# Patient Record
Sex: MALE | Race: WHITE
[De-identification: names, ages, dates, MRNs, and addresses within clinical notes are randomized per-mention and may not be internally consistent; named-entity substitution may affect disease eponyms.]

---

## 2018-12-22 ENCOUNTER — HOSPITAL ENCOUNTER (EMERGENCY)
Dept: HOSPITAL 47 - EC | Age: 83
Discharge: HOME | End: 2018-12-22
Payer: MEDICARE

## 2018-12-22 VITALS — DIASTOLIC BLOOD PRESSURE: 76 MMHG | SYSTOLIC BLOOD PRESSURE: 154 MMHG | HEART RATE: 77 BPM | RESPIRATION RATE: 18 BRPM

## 2018-12-22 VITALS — TEMPERATURE: 98 F

## 2018-12-22 DIAGNOSIS — J45.909: Primary | ICD-10-CM

## 2018-12-22 DIAGNOSIS — J84.10: ICD-10-CM

## 2018-12-22 DIAGNOSIS — Z87.891: ICD-10-CM

## 2018-12-22 DIAGNOSIS — R79.89: ICD-10-CM

## 2018-12-22 LAB
ANION GAP SERPL CALC-SCNC: 8 MMOL/L
BASOPHILS # BLD AUTO: 0 K/UL (ref 0–0.2)
BASOPHILS NFR BLD AUTO: 0 %
BUN SERPL-SCNC: 22 MG/DL (ref 9–20)
CALCIUM SPEC-MCNC: 9.1 MG/DL (ref 8.4–10.2)
CHLORIDE SERPL-SCNC: 105 MMOL/L (ref 98–107)
CO2 SERPL-SCNC: 25 MMOL/L (ref 22–30)
EOSINOPHIL # BLD AUTO: 0.1 K/UL (ref 0–0.7)
EOSINOPHIL NFR BLD AUTO: 2 %
ERYTHROCYTE [DISTWIDTH] IN BLOOD BY AUTOMATED COUNT: 3.95 M/UL (ref 4.3–5.9)
ERYTHROCYTE [DISTWIDTH] IN BLOOD: 12 % (ref 11.5–15.5)
GLUCOSE SERPL-MCNC: 116 MG/DL (ref 74–99)
HCT VFR BLD AUTO: 37.4 % (ref 39–53)
HGB BLD-MCNC: 12.7 GM/DL (ref 13–17.5)
LYMPHOCYTES # SPEC AUTO: 0.7 K/UL (ref 1–4.8)
LYMPHOCYTES NFR SPEC AUTO: 14 %
MCH RBC QN AUTO: 32.2 PG (ref 25–35)
MCHC RBC AUTO-ENTMCNC: 34 G/DL (ref 31–37)
MCV RBC AUTO: 94.6 FL (ref 80–100)
MONOCYTES # BLD AUTO: 0.4 K/UL (ref 0–1)
MONOCYTES NFR BLD AUTO: 7 %
NEUTROPHILS # BLD AUTO: 4 K/UL (ref 1.3–7.7)
NEUTROPHILS NFR BLD AUTO: 75 %
PLATELET # BLD AUTO: 210 K/UL (ref 150–450)
POTASSIUM SERPL-SCNC: 4.6 MMOL/L (ref 3.5–5.1)
SODIUM SERPL-SCNC: 138 MMOL/L (ref 137–145)
WBC # BLD AUTO: 5.3 K/UL (ref 3.8–10.6)

## 2018-12-22 PROCEDURE — 99285 EMERGENCY DEPT VISIT HI MDM: CPT

## 2018-12-22 PROCEDURE — 93005 ELECTROCARDIOGRAM TRACING: CPT

## 2018-12-22 PROCEDURE — 84484 ASSAY OF TROPONIN QUANT: CPT

## 2018-12-22 PROCEDURE — 80048 BASIC METABOLIC PNL TOTAL CA: CPT

## 2018-12-22 PROCEDURE — 85025 COMPLETE CBC W/AUTO DIFF WBC: CPT

## 2018-12-22 PROCEDURE — 83880 ASSAY OF NATRIURETIC PEPTIDE: CPT

## 2018-12-22 PROCEDURE — 87502 INFLUENZA DNA AMP PROBE: CPT

## 2018-12-22 PROCEDURE — 36415 COLL VENOUS BLD VENIPUNCTURE: CPT

## 2018-12-22 PROCEDURE — 71046 X-RAY EXAM CHEST 2 VIEWS: CPT

## 2018-12-22 PROCEDURE — 94640 AIRWAY INHALATION TREATMENT: CPT

## 2018-12-22 NOTE — XR
EXAMINATION TYPE: XR chest 2V

 

DATE OF EXAM: 12/22/2018

 

COMPARISON: NONE

 

HISTORY: Cough and congestion

 

TECHNIQUE:  Frontal and lateral views of the chest are obtained.

 

FINDINGS:  There is no heart failure nor confluent pneumonic infiltrate. Costophrenic angles are nii
r. There are chest leads. There is right shoulder prosthesis. There is some coarsening of the interst
itial markings in the mid and upper lobes.

 

IMPRESSION:  No active cardiopulmonary disease. Mild pulmonary fibrosis. Normal heart.

## 2018-12-22 NOTE — ED
General Adult HPI





- General


Chief complaint: Shortness of Breath


Stated complaint: SOB


Time Seen by Provider: 12/22/18 16:06


Source: patient


Mode of arrival: ambulatory


Limitations: no limitations





- History of Present Illness


Initial comments: 


Patient is an 84-year-old male who presents with a chief complaint of cough for 

over a week.  The patient states that he has been coughing up yellow phlegm.  

He states that he gets short of breath when he tries to exert himself.  It is a 

history of asthma, he quit smoking 50 years ago.  Patient does not take any 

medications at home.  He does not have any history of cardiac pathology, strokes

, DVTs or PEs.  He does not have any known ALLERGIES.  The patient states that 

he has had sick contacts.  He cannot identify any aggravating or alleviating 

factors to his cough.








- Related Data


 Home Medications











 Medication  Instructions  Recorded  Confirmed


 


Herbal Multivitamin 1 tab PO DAILY 12/22/18 12/22/18








 Previous Rx's











 Medication  Instructions  Recorded


 


Albuterol Inhaler [Ventolin Hfa 1 - 2 puff INHALATION Q4H #1 12/22/18





Inhaler] inhaler 


 


Azithromycin [Zithromax] 250 mg PO AS DIRECTED #6 tab 12/22/18


 


predniSONE [Deltasone] 20 mg PO DAILY #12 tablet 12/22/18











 Allergies











Allergy/AdvReac Type Severity Reaction Status Date / Time


 


No Known Allergies Allergy   Verified 12/22/18 16:40














Review of Systems


ROS Statement: 


Those systems with pertinent positive or pertinent negative responses have been 

documented in the HPI.





ROS Other: All systems not noted in ROS Statement are negative.


Respiratory: Reports: cough, dyspnea





Past Medical History


Past Medical History: Asthma


History of Any Multi-Drug Resistant Organisms: None Reported


Past Surgical History: Hernia Repair


Additional Past Surgical History / Comment(s): Shoulder, Brandon Knee, Back


Past Psychological History: No Psychological Hx Reported


Smoking Status: Never smoker


Past Alcohol Use History: Occasional


Past Drug Use History: None Reported





General Exam


Limitations: no limitations


General appearance: alert, in no apparent distress


Head exam: Present: atraumatic, normocephalic


Eye exam: Present: normal appearance


ENT exam: Present: normal exam, mucous membranes moist


Neck exam: Present: normal inspection.  Absent: lymphadenopathy


Respiratory exam: Present: wheezes (Expiratory), decreased breath sounds


Cardiovascular Exam: Present: regular rate, normal rhythm


GI/Abdominal exam: Present: soft, other (Diastases recti present.).  Absent: 

distended, tenderness


Rectal exam: Present: deferred


Extremities exam: Present: normal inspection


Back exam: Present: normal inspection


Neurological exam: Present: alert, oriented X3, CN II-XII intact, normal gait


Psychiatric exam: Present: normal affect, normal mood


Skin exam: Present: warm, dry, intact





Course


 Vital Signs











  12/22/18 12/22/18 12/22/18





  15:29 16:30 16:52


 


Temperature 98 F  


 


Pulse Rate 100 100 92


 


Respiratory 24  





Rate   


 


Blood Pressure 165/89  


 


O2 Sat by Pulse 98  





Oximetry   














  12/22/18





  17:00


 


Temperature 


 


Pulse Rate 94


 


Respiratory 





Rate 


 


Blood Pressure 157/89


 


O2 Sat by Pulse 96





Oximetry 














Medical Decision Making





- Medical Decision Making


Patient presents with a chief complaint of a cough.  On initial evaluation, 

vital signs are stable, patient is in no acute distress.  Patient will be 

evaluated with basic labs including cardiac enzymes, and chest x-ray.  He was 

given aspirin, DuoNeb, prednisone.  Suspect bronchitis.





6:23 PM


Lab evaluation this patient is unremarkable except for mild increase in 

creatinine which is of unknown chronicity.  Electrolytes are within normal 

limits.  Chest x-ray shows no acute process, of note there is a mild degree of 

pulmonary fibrosis.  At reevaluation, the patient states he feels improved 

after breathing treatments and steroids.  He'll be treated for bronchitis on 

the outpatient basis.  He was instructed to follow up with primary care in 1-2 

days, return to the ED if symptoms worsen or change.








- Lab Data


Result diagrams: 


 12/22/18 16:40





 12/22/18 16:40


 Lab Results











  12/22/18 12/22/18 12/22/18 Range/Units





  16:20 16:40 16:40 


 


WBC    5.3  (3.8-10.6)  k/uL


 


RBC    3.95 L  (4.30-5.90)  m/uL


 


Hgb    12.7 L  (13.0-17.5)  gm/dL


 


Hct    37.4 L  (39.0-53.0)  %


 


MCV    94.6  (80.0-100.0)  fL


 


MCH    32.2  (25.0-35.0)  pg


 


MCHC    34.0  (31.0-37.0)  g/dL


 


RDW    12.0  (11.5-15.5)  %


 


Plt Count    210  (150-450)  k/uL


 


Neutrophils %    75  %


 


Lymphocytes %    14  %


 


Monocytes %    7  %


 


Eosinophils %    2  %


 


Basophils %    0  %


 


Neutrophils #    4.0  (1.3-7.7)  k/uL


 


Lymphocytes #    0.7 L  (1.0-4.8)  k/uL


 


Monocytes #    0.4  (0-1.0)  k/uL


 


Eosinophils #    0.1  (0-0.7)  k/uL


 


Basophils #    0.0  (0-0.2)  k/uL


 


Sodium   138   (137-145)  mmol/L


 


Potassium   4.6   (3.5-5.1)  mmol/L


 


Chloride   105   ()  mmol/L


 


Carbon Dioxide   25   (22-30)  mmol/L


 


Anion Gap   8   mmol/L


 


BUN   22 H   (9-20)  mg/dL


 


Creatinine   1.49 H   (0.66-1.25)  mg/dL


 


Est GFR (CKD-EPI)AfAm   49   (>60 ml/min/1.73 sqM)  


 


Est GFR (CKD-EPI)NonAf   43   (>60 ml/min/1.73 sqM)  


 


Glucose   116 H   (74-99)  mg/dL


 


Calcium   9.1   (8.4-10.2)  mg/dL


 


Troponin I     (0.000-0.034)  ng/mL


 


NT-Pro-B Natriuret Pep     pg/mL


 


Influenza Type A RNA  Not Detected    (Not Detectd)  


 


Influenza Type B (PCR)  Not Detected    (Not Detectd)  














  12/22/18 12/22/18 Range/Units





  16:40 16:40 


 


WBC    (3.8-10.6)  k/uL


 


RBC    (4.30-5.90)  m/uL


 


Hgb    (13.0-17.5)  gm/dL


 


Hct    (39.0-53.0)  %


 


MCV    (80.0-100.0)  fL


 


MCH    (25.0-35.0)  pg


 


MCHC    (31.0-37.0)  g/dL


 


RDW    (11.5-15.5)  %


 


Plt Count    (150-450)  k/uL


 


Neutrophils %    %


 


Lymphocytes %    %


 


Monocytes %    %


 


Eosinophils %    %


 


Basophils %    %


 


Neutrophils #    (1.3-7.7)  k/uL


 


Lymphocytes #    (1.0-4.8)  k/uL


 


Monocytes #    (0-1.0)  k/uL


 


Eosinophils #    (0-0.7)  k/uL


 


Basophils #    (0-0.2)  k/uL


 


Sodium    (137-145)  mmol/L


 


Potassium    (3.5-5.1)  mmol/L


 


Chloride    ()  mmol/L


 


Carbon Dioxide    (22-30)  mmol/L


 


Anion Gap    mmol/L


 


BUN    (9-20)  mg/dL


 


Creatinine    (0.66-1.25)  mg/dL


 


Est GFR (CKD-EPI)AfAm    (>60 ml/min/1.73 sqM)  


 


Est GFR (CKD-EPI)NonAf    (>60 ml/min/1.73 sqM)  


 


Glucose    (74-99)  mg/dL


 


Calcium    (8.4-10.2)  mg/dL


 


Troponin I   <0.012  (0.000-0.034)  ng/mL


 


NT-Pro-B Natriuret Pep  168   pg/mL


 


Influenza Type A RNA    (Not Detectd)  


 


Influenza Type B (PCR)    (Not Detectd)  














Disposition


Clinical Impression: 


 Bronchitis





Disposition: HOME SELF-CARE


Condition: Good


Instructions:  Acute Bronchitis (ED)


Is patient prescribed a controlled substance at d/c from ED?: No


Referrals: 


Mary Soto MD [Primary Care Provider] - 1-2 days

## 2021-04-27 ENCOUNTER — HOSPITAL ENCOUNTER (OUTPATIENT)
Dept: HOSPITAL 47 - RADXRYALE | Age: 86
Discharge: HOME | End: 2021-04-27
Attending: INTERNAL MEDICINE
Payer: MEDICARE

## 2021-04-27 DIAGNOSIS — M43.16: ICD-10-CM

## 2021-04-27 DIAGNOSIS — M16.11: Primary | ICD-10-CM

## 2021-04-27 PROCEDURE — 72110 X-RAY EXAM L-2 SPINE 4/>VWS: CPT

## 2021-04-27 PROCEDURE — 73502 X-RAY EXAM HIP UNI 2-3 VIEWS: CPT

## 2021-04-27 NOTE — XR
EXAM TYPE: LUMBAR SPINE X RAY SERIES

 

COMPARISON: NONE

 

HISTORY: Pain

 

TECHNIQUE: 4 views are submitted.

 

FINDINGS:

Postsurgical change throughout the lumbar spine with multilevel severe degenerative disc disease. Min
imal anterolisthesis of L4 relative to L5. Vertebral body height is preserved. Diffuse osteopenia not
ed. Multilevel severe degenerative disc disease. Curvature of the spine noted. SI joints symmetric.

 

IMPRESSION:

1. Postoperative changes with minimal anterolisthesis L4 on L5.

## 2021-04-27 NOTE — XR
EXAMINATION TYPE: XR Hip Complete RT

 

DATE OF EXAM: 4/27/2021

 

COMPARISON: NONE

 

HISTORY: Pain

 

TECHNIQUE: 2 views submitted

 

FINDINGS:

There is no evidence of erosive change or acute fracture. Diffuse osteopenia. Severe narrowing concen
trically the joint space. Postsurgical changes lower lumbar spine.

 

IMPRESSION:

1. 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

## 2021-06-01 ENCOUNTER — HOSPITAL ENCOUNTER (OUTPATIENT)
Dept: HOSPITAL 47 - RADUSWWP | Age: 86
Discharge: HOME | End: 2021-06-01
Attending: INTERNAL MEDICINE
Payer: MEDICARE

## 2021-06-01 DIAGNOSIS — R22.42: Primary | ICD-10-CM

## 2021-06-01 DIAGNOSIS — M79.605: ICD-10-CM

## 2021-06-01 NOTE — US
EXAMINATION TYPE: US venous doppler duplex LE LT

 

DATE OF EXAM: 6/1/2021 2:09 PM

 

COMPARISON: None

 

CLINICAL HISTORY: Left leg pain and swelling R22.42.

 

SIDE PERFORMED: Left  

 

TECHNIQUE:  The lower extremity deep venous system is examined utilizing real time linear array sonog
nicole with graded compression, doppler sonography and color-flow sonography.

 

VESSELS IMAGED:

Common Femoral Vein

Deep Femoral Vein

Greater Saphenous Vein *

Femoral Vein

Popliteal Vein

Small Saphenous Vein *

Proximal Calf Veins

(* superficial vessels)

 

 

Left Leg:  No evidence of deep venous thrombosis in the left lower extremity veins.

 

IMPRESSION: No evidence of deep venous thrombosis in the left lower extremity veins.

## 2021-08-04 ENCOUNTER — HOSPITAL ENCOUNTER (OUTPATIENT)
Dept: HOSPITAL 47 - RADUSWWP | Age: 86
Discharge: HOME | End: 2021-08-04
Attending: INTERNAL MEDICINE
Payer: MEDICARE

## 2021-08-04 DIAGNOSIS — R60.0: Primary | ICD-10-CM

## 2021-08-04 DIAGNOSIS — M79.604: ICD-10-CM

## 2021-08-04 NOTE — US
EXAMINATION TYPE: US venous doppler duplex LE RT

 

DATE OF EXAM: 8/4/2021 9:22 AM

 

COMPARISON: Prev Left leg only

 

CLINICAL HISTORY: R60.0 Edema. Pt states right leg pain and tightness 

 

SIDE PERFORMED: Right  

 

TECHNIQUE:  The lower extremity deep venous system is examined utilizing real time linear array sonog
nicole with graded compression, doppler sonography and color-flow sonography.

 

VESSELS IMAGED:

Common Femoral Vein

Deep Femoral Vein

Greater Saphenous Vein *

Femoral Vein

Popliteal Vein

Small Saphenous Vein *

Proximal Calf Veins

(* superficial vessels)

 

 

 

Right Leg:  Negative for DVT

 

 

 

 

IMPRESSION:

1. Right lower extremity ultrasound negative for deep venous thrombosis.

## 2021-11-01 ENCOUNTER — HOSPITAL ENCOUNTER (OUTPATIENT)
Dept: HOSPITAL 47 - LABWHC1 | Age: 86
Discharge: HOME | End: 2021-11-01
Attending: NURSE PRACTITIONER
Payer: MEDICARE

## 2021-11-01 DIAGNOSIS — I50.9: Primary | ICD-10-CM

## 2021-11-01 LAB
ERYTHROCYTE [DISTWIDTH] IN BLOOD BY AUTOMATED COUNT: 3.57 X 10*6/UL (ref 4.4–5.6)
ERYTHROCYTE [DISTWIDTH] IN BLOOD: 12.7 % (ref 11.5–14.5)
HCT VFR BLD AUTO: 35.4 % (ref 39.6–50)
HGB BLD-MCNC: 11.2 G/DL (ref 13–17)
MCH RBC QN AUTO: 31.4 PG (ref 27–32)
MCHC RBC AUTO-ENTMCNC: 31.6 G/DL (ref 32–37)
MCV RBC AUTO: 99.2 FL (ref 80–97)
PLATELET # BLD AUTO: 243 X 10*3/UL (ref 140–440)
WBC # BLD AUTO: 7.29 X 10*3/UL (ref 4.5–10)

## 2021-11-01 PROCEDURE — 36415 COLL VENOUS BLD VENIPUNCTURE: CPT

## 2021-11-01 PROCEDURE — 85027 COMPLETE CBC AUTOMATED: CPT

## 2021-11-01 PROCEDURE — 83880 ASSAY OF NATRIURETIC PEPTIDE: CPT

## 2021-11-02 ENCOUNTER — HOSPITAL ENCOUNTER (OUTPATIENT)
Dept: HOSPITAL 47 - LABWHC1 | Age: 86
Discharge: HOME | End: 2021-11-02
Attending: PSYCHIATRY & NEUROLOGY
Payer: MEDICARE

## 2021-11-02 DIAGNOSIS — I50.9: Primary | ICD-10-CM

## 2021-11-02 LAB
GLUCOSE 2H P 75 G GLC PO SERPL-MCNC: 115 MG/DL
GTT SERPL-IMP: (no result)

## 2021-11-02 PROCEDURE — 80053 COMPREHEN METABOLIC PANEL: CPT

## 2021-11-02 PROCEDURE — 82950 GLUCOSE TEST: CPT

## 2021-11-02 PROCEDURE — 82947 ASSAY GLUCOSE BLOOD QUANT: CPT

## 2021-11-02 PROCEDURE — 36415 COLL VENOUS BLD VENIPUNCTURE: CPT

## 2021-11-03 LAB
ALBUMIN SERPL-MCNC: 3.5 G/DL (ref 3.5–5)
ALBUMIN/GLOB SERPL: 1.1 {RATIO}
ALP SERPL-CCNC: 48 U/L (ref 38–126)
ALT SERPL-CCNC: 25 U/L (ref 4–49)
ANION GAP SERPL CALC-SCNC: 9 MMOL/L
AST SERPL-CCNC: 39 U/L (ref 17–59)
BUN SERPL-SCNC: 35 MG/DL (ref 9–20)
CALCIUM SPEC-MCNC: 9.5 MG/DL (ref 8.4–10.2)
CHLORIDE SERPL-SCNC: 106 MMOL/L (ref 98–107)
CO2 SERPL-SCNC: 24 MMOL/L (ref 22–30)
GLOBULIN SER CALC-MCNC: 3.2 G/DL
GLUCOSE SERPL-MCNC: 115 MG/DL (ref 74–99)
POTASSIUM SERPL-SCNC: 4.2 MMOL/L (ref 3.5–5.1)
PROT SERPL-MCNC: 6.7 G/DL (ref 6.3–8.2)
SODIUM SERPL-SCNC: 139 MMOL/L (ref 137–145)

## 2021-11-15 ENCOUNTER — HOSPITAL ENCOUNTER (EMERGENCY)
Dept: HOSPITAL 47 - EC | Age: 86
Discharge: HOME | End: 2021-11-15
Payer: MEDICARE

## 2021-11-15 VITALS
RESPIRATION RATE: 16 BRPM | TEMPERATURE: 98.3 F | DIASTOLIC BLOOD PRESSURE: 86 MMHG | HEART RATE: 84 BPM | SYSTOLIC BLOOD PRESSURE: 136 MMHG

## 2021-11-15 DIAGNOSIS — J45.909: ICD-10-CM

## 2021-11-15 DIAGNOSIS — U07.1: Primary | ICD-10-CM

## 2021-11-15 DIAGNOSIS — Z79.82: ICD-10-CM

## 2021-11-15 DIAGNOSIS — R06.89: ICD-10-CM

## 2021-11-15 LAB
ALBUMIN SERPL-MCNC: 3.5 G/DL (ref 3.5–5)
ALP SERPL-CCNC: 98 U/L (ref 38–126)
ALT SERPL-CCNC: 22 U/L (ref 4–49)
ANION GAP SERPL CALC-SCNC: 8 MMOL/L
APTT BLD: 25.9 SEC (ref 22–30)
AST SERPL-CCNC: 37 U/L (ref 17–59)
BASOPHILS # BLD AUTO: 0 K/UL (ref 0–0.2)
BASOPHILS NFR BLD AUTO: 0 %
BUN SERPL-SCNC: 34 MG/DL (ref 9–20)
CALCIUM SPEC-MCNC: 9 MG/DL (ref 8.4–10.2)
CHLORIDE SERPL-SCNC: 104 MMOL/L (ref 98–107)
CO2 SERPL-SCNC: 24 MMOL/L (ref 22–30)
EOSINOPHIL # BLD AUTO: 0.1 K/UL (ref 0–0.7)
EOSINOPHIL NFR BLD AUTO: 3 %
ERYTHROCYTE [DISTWIDTH] IN BLOOD BY AUTOMATED COUNT: 3.49 M/UL (ref 4.3–5.9)
ERYTHROCYTE [DISTWIDTH] IN BLOOD: 12.3 % (ref 11.5–15.5)
GLUCOSE SERPL-MCNC: 97 MG/DL (ref 74–99)
HCT VFR BLD AUTO: 33.4 % (ref 39–53)
HGB BLD-MCNC: 10.9 GM/DL (ref 13–17.5)
INR PPP: 1 (ref ?–1.2)
LIPASE SERPL-CCNC: 45 U/L (ref 23–300)
LYMPHOCYTES # SPEC AUTO: 0.7 K/UL (ref 1–4.8)
LYMPHOCYTES NFR SPEC AUTO: 17 %
MAGNESIUM SPEC-SCNC: 2.2 MG/DL (ref 1.6–2.3)
MCH RBC QN AUTO: 31.4 PG (ref 25–35)
MCHC RBC AUTO-ENTMCNC: 32.8 G/DL (ref 31–37)
MCV RBC AUTO: 95.6 FL (ref 80–100)
MONOCYTES # BLD AUTO: 0.3 K/UL (ref 0–1)
MONOCYTES NFR BLD AUTO: 8 %
NEUTROPHILS # BLD AUTO: 2.8 K/UL (ref 1.3–7.7)
NEUTROPHILS NFR BLD AUTO: 70 %
PLATELET # BLD AUTO: 237 K/UL (ref 150–450)
POTASSIUM SERPL-SCNC: 4.3 MMOL/L (ref 3.5–5.1)
PROT SERPL-MCNC: 6.7 G/DL (ref 6.3–8.2)
PT BLD: 10.4 SEC (ref 9–12)
SODIUM SERPL-SCNC: 136 MMOL/L (ref 137–145)
WBC # BLD AUTO: 3.9 K/UL (ref 3.8–10.6)

## 2021-11-15 PROCEDURE — 85730 THROMBOPLASTIN TIME PARTIAL: CPT

## 2021-11-15 PROCEDURE — 83690 ASSAY OF LIPASE: CPT

## 2021-11-15 PROCEDURE — 87635 SARS-COV-2 COVID-19 AMP PRB: CPT

## 2021-11-15 PROCEDURE — 85025 COMPLETE CBC W/AUTO DIFF WBC: CPT

## 2021-11-15 PROCEDURE — 80053 COMPREHEN METABOLIC PANEL: CPT

## 2021-11-15 PROCEDURE — 99285 EMERGENCY DEPT VISIT HI MDM: CPT

## 2021-11-15 PROCEDURE — 85610 PROTHROMBIN TIME: CPT

## 2021-11-15 PROCEDURE — 83880 ASSAY OF NATRIURETIC PEPTIDE: CPT

## 2021-11-15 PROCEDURE — 74176 CT ABD & PELVIS W/O CONTRAST: CPT

## 2021-11-15 PROCEDURE — 93005 ELECTROCARDIOGRAM TRACING: CPT

## 2021-11-15 PROCEDURE — 94640 AIRWAY INHALATION TREATMENT: CPT

## 2021-11-15 PROCEDURE — 83735 ASSAY OF MAGNESIUM: CPT

## 2021-11-15 PROCEDURE — 71046 X-RAY EXAM CHEST 2 VIEWS: CPT

## 2021-11-15 PROCEDURE — 96365 THER/PROPH/DIAG IV INF INIT: CPT

## 2021-11-15 PROCEDURE — 83605 ASSAY OF LACTIC ACID: CPT

## 2021-11-15 PROCEDURE — 96375 TX/PRO/DX INJ NEW DRUG ADDON: CPT

## 2021-11-15 PROCEDURE — 84484 ASSAY OF TROPONIN QUANT: CPT

## 2021-11-15 PROCEDURE — 36415 COLL VENOUS BLD VENIPUNCTURE: CPT

## 2021-11-15 NOTE — CT
EXAMINATION TYPE: CT abdomen pelvis wo con

 

DATE OF EXAM: 11/15/2021

 

COMPARISON: None

 

INDICATION: Stomach bloating and abdominal pain

 

DLP: 815.2 mGycm, Automated exposure control for dose reduction was used.

 

CONTRAST:  0 mL of Isovue 300. 

                        Study performed without Oral Contrast

 

TECHNIQUE: Axial images were obtained from above the diaphragm to the pubic rami in the axial plane a
t 5 mm thick sections.  Reconstructed images are reviewed on the computer in the coronal plane.  Beam
 hardening artifact from lumbar fixation causes some limitation during this examination.

 

FINDINGS:

 

Limited CT sections are obtained the lung bases.  The lung bases are clear.  Small hiatal hernia is p
resent.

 

CT ABDOMEN:

 

Liver: Normal

 

Spleen: Normal

 

Pancreas: Normal

 

Adrenal glands: The adrenal glands are normal.

 

Gallbladder: Normal  

 

Kidneys: No masses are evident. No hydronephrosis is present.   No cysts are present.  Delayed images
 were obtained through the kidneys, which remain unremarkable.

 

Aorta: Vascular calcification is within the aorta. 

 

Inferior vena cava: Normal.

 

CT PELVIS: 

Loops of bowel within the abdomen and pelvis are normal.     This study is without oral contrast.

 

Appendix: Normal as visualized.

 

Urinary bladder: Distended. There may be an diverticulum posterior and superior to the prostate. Jermaine
tional diverticulum may be near the left inguinal region. Left inguinal hernia is present. 

 

Genitourinary structures: Prostate is prominent.

 

Osseous structures: No suspicious lytic or sclerotic lesions. Postsurgical changes from fixation rods
 are evident to the lumbar spine and lower thoracic region.

 

IMPRESSIONS:

1.  Prostate hypertrophy with distended urinary bladder. Urinary bladder may have a couple of diverti
culi, one of which may extend minimally into a left inguinal hernia.

2. Postsurgical changes through the lumbar spine.

3. Hiatal hernia

## 2021-11-15 NOTE — XR
EXAMINATION TYPE: XR chest 2V

 

DATE OF EXAM: 11/15/2021

 

COMPARISON: 12/22/2018

 

TECHNIQUE: PA and lateral views submitted.

 

HISTORY: Difficulty breathing

 

FINDINGS:

Hyperexpansion. Heart size normal. Postsurgical change involving the right shoulder vertebral column.
 No overt failure or pneumothorax. No pleural effusion. No consolidation. Degenerative change of the 
spine.

 

IMPRESSION:

1. COPD

## 2021-11-15 NOTE — ED
SOB HPI





- General


Chief Complaint: Shortness of Breath


Stated Complaint: Cough, COPD


Time Seen by Provider: 11/15/21 08:00


Source: patient, family


Mode of arrival: ambulatory


Limitations: no limitations





- History of Present Illness


Initial Comments: 





Patient is an 87-year-old male with past medical history of asthma who presents 

to the emergency department with reported shortness of breath.  Daughter is at 

bedside and helps provide history.  States that the patient has been short of 

breath for approximately 3 weeks however over the past 4 days the patient has 

had worsening symptoms.  They have been using his inhalers as directed however 

the patient continues to be short of breath especially with exertion.  He denies

previous history of cardiac issues.  Denies any chest pain.  He does take Lasix 

20 mg daily and denies any worsening lower extremity edema.  He denies fevers or

sick contacts with similar symptoms.  No nausea, vomiting or diarrhea.  Patient 

does have some abdominal distention for which she has been evaluated by his 

[facility] care physician with no concerning diagnosis.  He admits to chronic left 

groin pain over the patient has a known hernia which is reducible.  The patient 

is vaccinated against Covid.  No other alleviating, precipitating or modifying 

factors





- Related Data


                                Home Medications











 Medication  Instructions  Recorded  Confirmed


 


Acetaminophen [Tylenol Arthritis] 650 mg PO Q12H PRN 11/15/21 11/15/21


 


Albuterol Inhaler [Ventolin Hfa 2 puff INHALATION RT-Q4H PRN 11/15/21 11/15/21





Inhaler]   


 


Albuterol Nebulized [Ventolin 2.5 mg INHALATION RT-Q6H PRN 11/15/21 11/15/21





Nebulized]   


 


Aspirin EC [Ecotrin Low Dose] 81 mg PO DAILY 11/15/21 11/15/21


 


Budesonide/Glycopyr/Formoterol 2 puff INHALATION RT-BID 11/15/21 11/15/21





[Breztri Aerosphere Inhaler]   


 


Furosemide [Lasix] 20 mg PO DAILY 11/15/21 11/15/21


 


QUEtiapine [SEROquel] 25 mg PO DAILY 11/15/21 11/15/21


 


QUEtiapine [SEROquel] 50 mg PO HS 11/15/21 11/15/21


 


lisinopriL [Zestril] 2.5 mg PO DAILY 11/15/21 11/15/21








                                  Previous Rx's











 Medication  Instructions  Recorded


 


Benzonatate [Tessalon Perles] 100 mg PO TID PRN #15 capsule 11/15/21


 


Codeine Phosphate/Guaifenesin 5 ml PO Q6H PRN 3 Days #60 ml 11/15/21





[Guaifen-Codeine 100-10 mg/5 ml]  


 


Dexamethasone [Decadron] 6 mg PO DAILY #5 tablet 11/15/21











                                    Allergies











Allergy/AdvReac Type Severity Reaction Status Date / Time


 


No Known Allergies Allergy   Verified 11/15/21 10:08














Review of Systems


ROS Statement: 


Those systems with pertinent positive or pertinent negative responses have been 

documented in the HPI.





ROS Other: All systems not noted in ROS Statement are negative.





Past Medical History


Past Medical History: Asthma


History of Any Multi-Drug Resistant Organisms: None Reported


Past Surgical History: Hernia Repair


Additional Past Surgical History / Comment(s): Shoulder, Brandon Knee, Back


Past Psychological History: No Psychological Hx Reported


Smoking Status: Never smoker


Past Alcohol Use History: Occasional


Past Drug Use History: None Reported





General Exam


Limitations: no limitations


General appearance: alert, in no apparent distress


Head exam: Present: atraumatic, normocephalic, normal inspection


Eye exam: Present: normal appearance, PERRL, EOMI.  Absent: scleral icterus, 

conjunctival injection, periorbital swelling


ENT exam: Present: normal exam, mucous membranes moist


Neck exam: Present: normal inspection.  Absent: tenderness, meningismus, 

lymphadenopathy


Respiratory exam: Present: wheezes (all lung fields prior to treatment).  

Absent: respiratory distress, rales, rhonchi, stridor, accessory muscle use


Cardiovascular Exam: Present: regular rate, normal rhythm, normal heart sounds. 

 Absent: systolic murmur, diastolic murmur, rubs, gallop, clicks


GI/Abdominal exam: Present: soft, distended, normal bowel sounds.  Absent: 

tenderness, guarding, rebound, rigid


Extremities exam: Present: normal inspection, full ROM, normal capillary refill.

  Absent: tenderness, pedal edema, joint swelling, calf tenderness


Back exam: Present: normal inspection


Neurological exam: Present: alert, oriented X3, CN II-XII intact


Psychiatric exam: Present: normal affect, normal mood


Skin exam: Present: warm, dry, intact, normal color.  Absent: rash





Course


                                   Vital Signs











  11/15/21 11/15/21 11/15/21





  07:59 09:50 10:00


 


Temperature 97.0 F L  


 


Pulse Rate 83 72 75


 


Respiratory 28 H  





Rate   


 


Blood Pressure 127/63  


 


O2 Sat by Pulse 97  





Oximetry   














  11/15/21 11/15/21 11/15/21





  11:32 12:41 13:27


 


Temperature  98.0 F 98.2 F


 


Pulse Rate  84 97


 


Respiratory 18 20 18





Rate   


 


Blood Pressure  141/88 128/72


 


O2 Sat by Pulse  98 98





Oximetry   














  11/15/21





  14:29


 


Temperature 98.3 F


 


Pulse Rate 84


 


Respiratory 16





Rate 


 


Blood Pressure 136/86


 


O2 Sat by Pulse 98





Oximetry 














Medical Decision Making





- Medical Decision Making





Upon arrival patient is placed into room 21.  Thorough history and physical exam

 was performed.  Patient placed on continuous pulse ox and cardiac monitoring.  

Patient does maintain saturations of 98% without any supplemental oxygen.  He is

 placed on 2 L for comfort.  Laboratory studies are obtained the patient is 

swabbed for Covid.  Laboratory studies are reviewed.  Creatinine is 1.7 which is

 near the patient's baseline.  Covid is detected in the patient's swab.  Chest 

x-ray does demonstrate COPD changes without acute infiltrate.  CT of the 

patient's abdomen and pelvis is performed due to his abdominal distention which 

demonstrates gaseous distention without any acute findings.  Incidental findings

 discussed with the patient.  At this time and did discuss the diagnosis, 

differential and treatment plan.  Patient will be discharged home at this time 

after antibody infusion.  No hypoxia witnessed from the patient for which she 

would require admission and he does not demonstrate any signs of respiratory 

distress.  Patient is instructed to follow-up with his primary care doctor 

within 2-4 days.  If he does not have any improvement in his symptoms after 

antibody infusion he should return to the emergency room.  Patient is given a 

dose of Decadron in the emergency department and will be placed on Decadron, 

codeine cough syrup and Tessalon Perles the outpatient setting.  Daughter is at 

bedside.  Both patient and daughter agreed to the treatment plan and the patient

 was discharged home in stable condition





- Lab Data


Result diagrams: 


                                 11/15/21 09:25





                                 11/15/21 09:25


                                   Lab Results











  11/15/21 11/15/21 11/15/21 Range/Units





  09:25 09:25 09:25 


 


WBC  3.9    (3.8-10.6)  k/uL


 


RBC  3.49 L    (4.30-5.90)  m/uL


 


Hgb  10.9 L    (13.0-17.5)  gm/dL


 


Hct  33.4 L    (39.0-53.0)  %


 


MCV  95.6    (80.0-100.0)  fL


 


MCH  31.4    (25.0-35.0)  pg


 


MCHC  32.8    (31.0-37.0)  g/dL


 


RDW  12.3    (11.5-15.5)  %


 


Plt Count  237    (150-450)  k/uL


 


MPV  7.2    


 


Neutrophils %  70    %


 


Lymphocytes %  17    %


 


Monocytes %  8    %


 


Eosinophils %  3    %


 


Basophils %  0    %


 


Neutrophils #  2.8    (1.3-7.7)  k/uL


 


Lymphocytes #  0.7 L    (1.0-4.8)  k/uL


 


Monocytes #  0.3    (0-1.0)  k/uL


 


Eosinophils #  0.1    (0-0.7)  k/uL


 


Basophils #  0.0    (0-0.2)  k/uL


 


PT   10.4   (9.0-12.0)  sec


 


INR   1.0   (<1.2)  


 


APTT   25.9   (22.0-30.0)  sec


 


Sodium    136 L  (137-145)  mmol/L


 


Potassium    4.3  (3.5-5.1)  mmol/L


 


Chloride    104  ()  mmol/L


 


Carbon Dioxide    24  (22-30)  mmol/L


 


Anion Gap    8  mmol/L


 


BUN    34 H  (9-20)  mg/dL


 


Creatinine    1.74 H  (0.66-1.25)  mg/dL


 


Est GFR (CKD-EPI)AfAm    40  (>60 ml/min/1.73 sqM)  


 


Est GFR (CKD-EPI)NonAf    35  (>60 ml/min/1.73 sqM)  


 


Glucose    97  (74-99)  mg/dL


 


Plasma Lactic Acid Dionisio     (0.7-2.0)  mmol/L


 


Calcium    9.0  (8.4-10.2)  mg/dL


 


Magnesium    2.2  (1.6-2.3)  mg/dL


 


Total Bilirubin    0.5  (0.2-1.3)  mg/dL


 


AST    37  (17-59)  U/L


 


ALT    22  (4-49)  U/L


 


Alkaline Phosphatase    98  ()  U/L


 


Troponin I     (0.000-0.034)  ng/mL


 


NT-Pro-B Natriuret Pep     pg/mL


 


Total Protein    6.7  (6.3-8.2)  g/dL


 


Albumin    3.5  (3.5-5.0)  g/dL


 


Lipase    45  ()  U/L


 


Coronavirus (PCR)     (Not Detectd)  














  11/15/21 11/15/21 11/15/21 Range/Units





  09:25 09:25 09:25 


 


WBC     (3.8-10.6)  k/uL


 


RBC     (4.30-5.90)  m/uL


 


Hgb     (13.0-17.5)  gm/dL


 


Hct     (39.0-53.0)  %


 


MCV     (80.0-100.0)  fL


 


MCH     (25.0-35.0)  pg


 


MCHC     (31.0-37.0)  g/dL


 


RDW     (11.5-15.5)  %


 


Plt Count     (150-450)  k/uL


 


MPV     


 


Neutrophils %     %


 


Lymphocytes %     %


 


Monocytes %     %


 


Eosinophils %     %


 


Basophils %     %


 


Neutrophils #     (1.3-7.7)  k/uL


 


Lymphocytes #     (1.0-4.8)  k/uL


 


Monocytes #     (0-1.0)  k/uL


 


Eosinophils #     (0-0.7)  k/uL


 


Basophils #     (0-0.2)  k/uL


 


PT     (9.0-12.0)  sec


 


INR     (<1.2)  


 


APTT     (22.0-30.0)  sec


 


Sodium     (137-145)  mmol/L


 


Potassium     (3.5-5.1)  mmol/L


 


Chloride     ()  mmol/L


 


Carbon Dioxide     (22-30)  mmol/L


 


Anion Gap     mmol/L


 


BUN     (9-20)  mg/dL


 


Creatinine     (0.66-1.25)  mg/dL


 


Est GFR (CKD-EPI)AfAm     (>60 ml/min/1.73 sqM)  


 


Est GFR (CKD-EPI)NonAf     (>60 ml/min/1.73 sqM)  


 


Glucose     (74-99)  mg/dL


 


Plasma Lactic Acid Dionisio  1.0    (0.7-2.0)  mmol/L


 


Calcium     (8.4-10.2)  mg/dL


 


Magnesium     (1.6-2.3)  mg/dL


 


Total Bilirubin     (0.2-1.3)  mg/dL


 


AST     (17-59)  U/L


 


ALT     (4-49)  U/L


 


Alkaline Phosphatase     ()  U/L


 


Troponin I   <0.012   (0.000-0.034)  ng/mL


 


NT-Pro-B Natriuret Pep    163  pg/mL


 


Total Protein     (6.3-8.2)  g/dL


 


Albumin     (3.5-5.0)  g/dL


 


Lipase     ()  U/L


 


Coronavirus (PCR)     (Not Detectd)  














  11/15/21 Range/Units





  09:25 


 


WBC   (3.8-10.6)  k/uL


 


RBC   (4.30-5.90)  m/uL


 


Hgb   (13.0-17.5)  gm/dL


 


Hct   (39.0-53.0)  %


 


MCV   (80.0-100.0)  fL


 


MCH   (25.0-35.0)  pg


 


MCHC   (31.0-37.0)  g/dL


 


RDW   (11.5-15.5)  %


 


Plt Count   (150-450)  k/uL


 


MPV   


 


Neutrophils %   %


 


Lymphocytes %   %


 


Monocytes %   %


 


Eosinophils %   %


 


Basophils %   %


 


Neutrophils #   (1.3-7.7)  k/uL


 


Lymphocytes #   (1.0-4.8)  k/uL


 


Monocytes #   (0-1.0)  k/uL


 


Eosinophils #   (0-0.7)  k/uL


 


Basophils #   (0-0.2)  k/uL


 


PT   (9.0-12.0)  sec


 


INR   (<1.2)  


 


APTT   (22.0-30.0)  sec


 


Sodium   (137-145)  mmol/L


 


Potassium   (3.5-5.1)  mmol/L


 


Chloride   ()  mmol/L


 


Carbon Dioxide   (22-30)  mmol/L


 


Anion Gap   mmol/L


 


BUN   (9-20)  mg/dL


 


Creatinine   (0.66-1.25)  mg/dL


 


Est GFR (CKD-EPI)AfAm   (>60 ml/min/1.73 sqM)  


 


Est GFR (CKD-EPI)NonAf   (>60 ml/min/1.73 sqM)  


 


Glucose   (74-99)  mg/dL


 


Plasma Lactic Acid Dionisio   (0.7-2.0)  mmol/L


 


Calcium   (8.4-10.2)  mg/dL


 


Magnesium   (1.6-2.3)  mg/dL


 


Total Bilirubin   (0.2-1.3)  mg/dL


 


AST   (17-59)  U/L


 


ALT   (4-49)  U/L


 


Alkaline Phosphatase   ()  U/L


 


Troponin I   (0.000-0.034)  ng/mL


 


NT-Pro-B Natriuret Pep   pg/mL


 


Total Protein   (6.3-8.2)  g/dL


 


Albumin   (3.5-5.0)  g/dL


 


Lipase   ()  U/L


 


Coronavirus (PCR)  Detected A  (Not Detectd)  














- EKG Data


EKG Comments: 





EKG demonstrates a sinus rhythm with a ventricular rate of 80.  RI interval 138.

  QRS 80.  .  Some ST depression in 2, 3 and aVF.  No acute ST segment 

elevations.  Morphology is similar in appearance from EKG in 2018





Disposition


Clinical Impression: 


 COVID-19, Respiratory insufficiency





Disposition: HOME SELF-CARE


Condition: Stable


Instructions (If sedation given, give patient instructions):  Coronavirus 

Disease 2019 (COVID-19)


Additional Instructions: 


You were given antibodies today in the emergency department.  Please take the 

steroids as directed.  Use your inhaler every 4 hours.  Follow up with your 

primary care doctor.  Return to the emergency department for any new or 

worsening symptoms.  I recommend that you buy pulse ox and if your oxygenation 

sustains in the 80s to return to the hospital


Prescriptions: 


Dexamethasone [Decadron] 6 mg PO DAILY #5 tablet


Codeine Phosphate/Guaifenesin [Guaifen-Codeine 100-10 mg/5 ml] 5 ml PO Q6H PRN 3

Days #60 ml


 PRN Reason: Cough


Benzonatate [Tessalon Perles] 100 mg PO TID PRN #15 capsule


 PRN Reason: Cough


Is patient prescribed a controlled substance at d/c from ED?: Yes


When asked, does pt state using other controlled substances?: No


If prescribed controlled substance>3 days was MAPS reviewed?: Prescribed <3 Days


If opioid is for acute pain is fill amount 7 days or less?: Yes


If Rx opioid, was Start Talking consent form obtained?: Yes


Referrals: 


Mary Soto MD [Primary Care Provider] - 1-2 days


Time of Disposition: 12:56

## 2022-01-31 ENCOUNTER — HOSPITAL ENCOUNTER (EMERGENCY)
Dept: HOSPITAL 47 - EC | Age: 87
Discharge: HOME | End: 2022-01-31
Payer: MEDICARE

## 2022-01-31 VITALS
TEMPERATURE: 97.6 F | SYSTOLIC BLOOD PRESSURE: 131 MMHG | RESPIRATION RATE: 16 BRPM | HEART RATE: 83 BPM | DIASTOLIC BLOOD PRESSURE: 77 MMHG
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
## 2022-01-31 DIAGNOSIS — S06.0X9A: Primary | ICD-10-CM

## 2022-01-31 DIAGNOSIS — Z79.899: ICD-10-CM

## 2022-01-31 DIAGNOSIS — J45.909: ICD-10-CM

## 2022-01-31 DIAGNOSIS — Z79.51: ICD-10-CM

## 2022-01-31 LAB
PH UR: 6 [PH] (ref 5–8)
SP GR UR: 1.02 (ref 1–1.03)
UROBILINOGEN UR QL STRIP: <2 MG/DL (ref ?–2)

## 2022-01-31 PROCEDURE — 70486 CT MAXILLOFACIAL W/O DYE: CPT

## 2022-01-31 PROCEDURE — 99284 EMERGENCY DEPT VISIT MOD MDM: CPT

## 2022-01-31 PROCEDURE — 81003 URINALYSIS AUTO W/O SCOPE: CPT

## 2022-01-31 PROCEDURE — 70450 CT HEAD/BRAIN W/O DYE: CPT

## 2022-01-31 PROCEDURE — 72125 CT NECK SPINE W/O DYE: CPT

## 2022-01-31 NOTE — CT
EXAMINATION TYPE: CT facial bones wo con

 

DATE OF EXAM: 1/31/2022

 

COMPARISON: None

 

HISTORY: fall/trauma.  no prior on PACS

 

CT DLP: 335.2 mGycm

Automated exposure control for dose reduction was used.

Images obtained from the bottom of the mandible to the top of the frontal sinuses without contrast.

 

Mandibular ring is intact. The temporomandibular joints are intact. Maxilla is intact. Nasal bone is 
intact. The orbital margins are intact.  There is no evidence of orbital blowout fracture. There is n
ormal aeration of the mastoid sinuses. There is fairly normal aeration of the paranasal sinuses. Ther
e is no evidence of retro-orbital mass. There is cerebral cortical atrophy.

 

IMPRESSION:

No acute abnormality of the facial bones. No fracture.

## 2022-01-31 NOTE — ED
General Adult HPI





- General


Chief complaint: Fall


Stated complaint: Fall


Time Seen by Provider: 01/31/22 03:25


Source: patient, family, EMS, RN notes reviewed, old records reviewed


Mode of arrival: EMS





- History of Present Illness


Initial comments: 





87-year-old male presenting status post fall.  Patient fell striking the right 

side of his face and on the back of his head.  No anticoagulation.  There is 

some discussion about goals of care with this patient.  There may be discussion 

of hospice care.  Patient's daughters wanted him evaluated there was concern for

possible UTI.  There was no external signs of trauma, no bleeding.





- Related Data


                                Home Medications











 Medication  Instructions  Recorded  Confirmed


 


Acetaminophen [Tylenol Arthritis] 650 mg PO Q12H PRN 11/15/21 11/15/21


 


Albuterol Inhaler [Ventolin Hfa 2 puff INHALATION RT-Q4H PRN 11/15/21 11/15/21





Inhaler]   


 


Albuterol Nebulized [Ventolin 2.5 mg INHALATION RT-Q6H PRN 11/15/21 11/15/21





Nebulized]   


 


Aspirin EC [Ecotrin Low Dose] 81 mg PO DAILY 11/15/21 11/15/21


 


Budesonide/Glycopyr/Formoterol 2 puff INHALATION RT-BID 11/15/21 11/15/21





[Breztri Aerosphere Inhaler]   


 


Furosemide [Lasix] 20 mg PO DAILY 11/15/21 11/15/21


 


QUEtiapine [SEROquel] 25 mg PO DAILY 11/15/21 11/15/21


 


QUEtiapine [SEROquel] 50 mg PO HS 11/15/21 11/15/21


 


lisinopriL [Zestril] 2.5 mg PO DAILY 11/15/21 11/15/21








                                  Previous Rx's











 Medication  Instructions  Recorded


 


Benzonatate [Tessalon Perles] 100 mg PO TID PRN #15 capsule 11/15/21


 


Codeine Phosphate/Guaifenesin 5 ml PO Q6H PRN 3 Days #60 ml 11/15/21





[Guaifen-Codeine 100-10 mg/5 ml]  


 


Dexamethasone [Decadron] 6 mg PO DAILY #5 tablet 11/15/21











                                    Allergies











Allergy/AdvReac Type Severity Reaction Status Date / Time


 


No Known Allergies Allergy   Verified 11/15/21 10:08














Review of Systems


ROS Statement: 


Those systems with pertinent positive or pertinent negative responses have been 

documented in the HPI.





ROS Other: All systems not noted in ROS Statement are negative.





Past Medical History


Past Medical History: Asthma


History of Any Multi-Drug Resistant Organisms: None Reported


Past Surgical History: Hernia Repair


Additional Past Surgical History / Comment(s): Shoulder, Brandon Knee, Back


Past Psychological History: No Psychological Hx Reported


Smoking Status: Never smoker


Past Alcohol Use History: Occasional


Past Drug Use History: None Reported





General Exam


General appearance: alert, in no apparent distress


Head exam: Present: atraumatic, normocephalic


Eye exam: Present: normal appearance, PERRL


ENT exam: Present: mucous membranes moist


Neck exam: Present: normal inspection.  Absent: tenderness, meningismus


Respiratory exam: Present: normal lung sounds bilaterally.  Absent: respiratory 

distress, wheezes


Cardiovascular Exam: Present: regular rate, normal rhythm


GI/Abdominal exam: Present: soft.  Absent: distended, tenderness


Extremities exam: Present: normal inspection, normal capillary refill.  Absent: 

pedal edema, calf tenderness


Neurological exam: Present: alert, CN II-XII intact.  Absent: oriented X3


Psychiatric exam: Present: normal affect, normal mood


Skin exam: Present: warm, dry, intact





Course


                                   Vital Signs











  01/31/22





  03:32


 


Temperature 97.6 F


 


Pulse Rate 83


 


Respiratory 16





Rate 


 


Blood Pressure 131/77


 


O2 Sat by Pulse 96





Oximetry 














Medical Decision Making





- Medical Decision Making





87-year-old male with fall.  Head CT, cervical spine and facial bones performed,

 negative for fracture or traumatic injury, no intracranial hemorrhage.  

Urinalysis negative.  Daughter is at bedside.





- Lab Data


                                   Lab Results











  01/31/22 Range/Units





  05:00 


 


Urine Color  Yellow  


 


Urine Appearance  Clear  (Clear)  


 


Urine pH  6.0  (5.0-8.0)  


 


Ur Specific Gravity  1.017  (1.001-1.035)  


 


Urine Protein  Trace H  (Negative)  


 


Urine Glucose (UA)  Negative  (Negative)  


 


Urine Ketones  Negative  (Negative)  


 


Urine Blood  Negative  (Negative)  


 


Urine Nitrite  Negative  (Negative)  


 


Urine Bilirubin  Negative  (Negative)  


 


Urine Urobilinogen  <2.0  (<2.0)  mg/dL


 


Ur Leukocyte Esterase  Negative  (Negative)  














Disposition


Clinical Impression: 


 Fall, Concussion





Disposition: HOME SELF-CARE


Condition: Fair


Instructions (If sedation given, give patient instructions):  Fall Prevention 

for Older Adults (ED)


Is patient prescribed a controlled substance at d/c from ED?: No


Referrals: 


Mary Soto MD [Primary Care Provider] - 1-2 days


Time of Disposition: 05:25

## 2022-01-31 NOTE — CT
EXAMINATION TYPE: CT brain madhu dee con

 

DATE OF EXAM: 1/31/2022

 

COMPARISON: None

 

HISTORY: fall/trauma.  no prior on PACS

 

CT DLP: 758.4 mGycm

Automated exposure control for dose reduction was used.

 

Images obtained from the skull base to T1 vertebra without contrast. Images of the brain obtained wit
hout contrast.

 

There is cerebral cortical atrophy. There is no mass effect or midline shift. There is no sign of int
racranial hemorrhage. Calvarium is intact. There is no evidence of cerebral edema. Skull base is inta
ct. Mastoid sinuses appear normal.

 

The cervical vertebra have normal alignment. There is degenerative disc space narrowing from C4 to C7
 with moderate spurring of the endplates. There is multilevel cervical facet arthropathy. There is no
 compression fracture. There are some fibrotic changes at the lung apices. There are irregular infilt
rates at the lung apices.

 

IMPRESSION:

Cerebral atrophy. No acute intracranial abnormality.

 

Spondylotic changes in the cervical spine. No fracture.